# Patient Record
(demographics unavailable — no encounter records)

---

## 2019-10-12 NOTE — NUR
MD noticed that the patient was not in the room. The gown is laying over the 
siderail and no belongings in the room.

## 2019-10-12 NOTE — NUR
pt with stable vs, ra sats 94% rr 22.  pt reports moderate geovani to lungs and 
chest heaviness , increased with couging and deep breathing . awaiting er 
provider.